# Patient Record
Sex: FEMALE | Race: WHITE | NOT HISPANIC OR LATINO | Employment: FULL TIME | URBAN - METROPOLITAN AREA
[De-identification: names, ages, dates, MRNs, and addresses within clinical notes are randomized per-mention and may not be internally consistent; named-entity substitution may affect disease eponyms.]

---

## 2018-03-19 ENCOUNTER — OFFICE VISIT (OUTPATIENT)
Dept: OBGYN CLINIC | Facility: MEDICAL CENTER | Age: 48
End: 2018-03-19
Payer: COMMERCIAL

## 2018-03-19 VITALS — SYSTOLIC BLOOD PRESSURE: 116 MMHG | BODY MASS INDEX: 34.46 KG/M2 | DIASTOLIC BLOOD PRESSURE: 72 MMHG | WEIGHT: 220 LBS

## 2018-03-19 DIAGNOSIS — Z01.419 ENCNTR FOR GYN EXAM (GENERAL) (ROUTINE) W/O ABN FINDINGS: Primary | ICD-10-CM

## 2018-03-19 DIAGNOSIS — Z12.31 ENCOUNTER FOR SCREENING MAMMOGRAM FOR MALIGNANT NEOPLASM OF BREAST: ICD-10-CM

## 2018-03-19 PROCEDURE — 87624 HPV HI-RISK TYP POOLED RSLT: CPT | Performed by: PHYSICIAN ASSISTANT

## 2018-03-19 PROCEDURE — G0145 SCR C/V CYTO,THINLAYER,RESCR: HCPCS | Performed by: PHYSICIAN ASSISTANT

## 2018-03-19 PROCEDURE — 99396 PREV VISIT EST AGE 40-64: CPT | Performed by: PHYSICIAN ASSISTANT

## 2018-03-19 NOTE — PROGRESS NOTES
Ojedaoseas Reed  1970      CC:  Yearly exam    S:  53 y o  female here for yearly exam   She is sexually active  She uses Mirena for contraception  She is amenorrheic with this, and it is due for a swap  She will complete paperwork and return it to us  Last Pap 2015 neg  Last Mammo few years ago      Current Outpatient Prescriptions:     levonorgestrel (MIRENA, 46 MG,) 20 MCG/24HR IUD, by Intrauterine route, Disp: , Rfl:   Social History     Social History    Marital status: /Civil Union     Spouse name: N/A    Number of children: N/A    Years of education: N/A     Occupational History    Not on file  Social History Main Topics    Smoking status: Never Smoker    Smokeless tobacco: Never Used    Alcohol use Yes    Drug use: No    Sexual activity: Yes     Partners: Male     Birth control/ protection: IUD     Other Topics Concern    Not on file     Social History Narrative    Coffee         Family History   Problem Relation Age of Onset    Colon cancer Maternal Grandfather       Past Medical History:   Diagnosis Date    No known problems         O:  Blood pressure 116/72, weight 99 8 kg (220 lb)  Patient appears well and is not in distress  Neck is supple without masses  Breasts are symmetrical without mass, tenderness, nipple discharge, skin changes or adenopathy  Abdomen is soft and nontender without masses  External genitals are normal without lesions or rashes  Vagina is normal without discharge or bleeding  Cervix is normal without discharge or lesion  Uterus is normal, mobile, nontender without palpable mass  Adnexa are normal, nontender, without palpable mass  A:  Yearly exam      P:   Pap and HPV today   Mammo slip provided    Swap IUD   RTO one year for yearly exam or sooner as needed

## 2018-03-21 LAB — HPV RRNA GENITAL QL NAA+PROBE: NORMAL

## 2018-03-22 LAB
LAB AP GYN PRIMARY INTERPRETATION: NORMAL
Lab: NORMAL

## 2018-03-23 ENCOUNTER — HOSPITAL ENCOUNTER (OUTPATIENT)
Dept: MAMMOGRAPHY | Facility: CLINIC | Age: 48
Discharge: HOME | End: 2018-03-23
Payer: COMMERCIAL

## 2018-03-23 DIAGNOSIS — Z12.31 ENCOUNTER FOR SCREENING MAMMOGRAM FOR MALIGNANT NEOPLASM OF BREAST: ICD-10-CM

## 2018-03-23 PROCEDURE — 77063 BREAST TOMOSYNTHESIS BI: CPT

## 2018-03-23 PROCEDURE — 77067 SCR MAMMO BI INCL CAD: CPT

## 2018-04-12 ENCOUNTER — TELEPHONE (OUTPATIENT)
Dept: OBGYN CLINIC | Facility: MEDICAL CENTER | Age: 48
End: 2018-04-12

## 2018-04-30 ENCOUNTER — OFFICE VISIT (OUTPATIENT)
Dept: OBGYN CLINIC | Facility: MEDICAL CENTER | Age: 48
End: 2018-04-30
Payer: COMMERCIAL

## 2018-04-30 VITALS — WEIGHT: 219 LBS | SYSTOLIC BLOOD PRESSURE: 118 MMHG | DIASTOLIC BLOOD PRESSURE: 72 MMHG | BODY MASS INDEX: 34.3 KG/M2

## 2018-04-30 DIAGNOSIS — Z30.433 ENCOUNTER FOR IUD REMOVAL AND REINSERTION: ICD-10-CM

## 2018-04-30 PROCEDURE — 58300 INSERT INTRAUTERINE DEVICE: CPT | Performed by: PHYSICIAN ASSISTANT

## 2018-04-30 PROCEDURE — 58301 REMOVE INTRAUTERINE DEVICE: CPT | Performed by: PHYSICIAN ASSISTANT

## 2018-04-30 NOTE — PROGRESS NOTES
Milagros Shannon  1970      CC:  IUD swap    S:  53 y o  female here for IUD swap  The reason for her swap is  IUD  She requests Mirena IUD  We reviewed the risks of IUD insertion including pain, perforation, migration, perforation, irregular bleeding, and infection  She is aware that with Mirena she can expect up to 6 months of irregular bleeding and at that point her periods should become lighter, shorter, and less crampy, and that many women stop having periods with their Mirena  She did premedicate with ibuprofen and a snack prior to insertion  Written consent was obtained from the patient prior to the procedure  No physician was available in the office and a phone consult was offered to the patient with a physician, which she declined  O:   Blood pressure 118/72, weight 99 3 kg (219 lb), last menstrual period 2018  Patient appears well and is not in distress  Abdomen is soft and nontender  External genitals are normal without lesion or rash  Vagina is normal with menses present  Cervix is normal without lesion  PROCEDURE:   IUD strings were visualized, grasped with a ring forceps, and removed without difficulty  The cervix was cleansed with Betadine  The uterus was sounded to 7 cm  IUD was inserted to the fundus without dilation and without tenaculum  Strings were trimmed to 3cm    The patient tolerated the procedure well without complication  A:  IUD swap    P: The patient will return in one month for IUD check but knows to call sooner should she have problems prior to that visit

## 2018-06-04 ENCOUNTER — OFFICE VISIT (OUTPATIENT)
Dept: OBGYN CLINIC | Facility: MEDICAL CENTER | Age: 48
End: 2018-06-04
Payer: COMMERCIAL

## 2018-06-04 VITALS — BODY MASS INDEX: 33.2 KG/M2 | WEIGHT: 212 LBS | DIASTOLIC BLOOD PRESSURE: 76 MMHG | SYSTOLIC BLOOD PRESSURE: 122 MMHG

## 2018-06-04 DIAGNOSIS — Z97.5 IUD (INTRAUTERINE DEVICE) IN PLACE: Primary | ICD-10-CM

## 2018-06-04 PROCEDURE — 99213 OFFICE O/P EST LOW 20 MIN: CPT | Performed by: PHYSICIAN ASSISTANT

## 2018-06-04 NOTE — PROGRESS NOTES
Castillorodgerblekis Nicole  1970      CC: IUD check    S: 53 y o  female here for IUD check  She is status post placement of a Mirena IUD on 4/30/18 (cristian)  She has had no bleeding since placement  She has had no pain or other side effects  No LMP recorded  Patient has had an implant  Current Outpatient Prescriptions:     levonorgestrel (MIRENA, 46 MG,) 20 MCG/24HR IUD, by Intrauterine route, Disp: , Rfl:   Social History     Social History    Marital status: /Civil Union     Spouse name: N/A    Number of children: N/A    Years of education: N/A     Occupational History    Not on file  Social History Main Topics    Smoking status: Never Smoker    Smokeless tobacco: Never Used    Alcohol use Yes    Drug use: No    Sexual activity: Yes     Partners: Male     Birth control/ protection: IUD     Other Topics Concern    Not on file     Social History Narrative    Coffee         Family History   Problem Relation Age of Onset    Colon cancer Maternal Grandfather      Past Medical History:   Diagnosis Date    No known problems        O:  Blood pressure 122/76, weight 96 2 kg (212 lb)  Abdomen is soft and nontender  External genitals are normal without rashes or lesions  Vagina is normal without discharge or bleeding  Cervix is normal without discharge or lesion  IUD strings are normal      A:  IUD in place    P:  Patient was reassured that irregular bleeding is common for the first six months of IUD use and that this should slowly resolve over time  She will call with any persistently abnormal bleeding or other problems  She will return for her yearly exam as scheduled

## 2019-08-19 ENCOUNTER — ANNUAL EXAM (OUTPATIENT)
Dept: OBGYN CLINIC | Facility: MEDICAL CENTER | Age: 49
End: 2019-08-19
Payer: COMMERCIAL

## 2019-08-19 VITALS
SYSTOLIC BLOOD PRESSURE: 118 MMHG | WEIGHT: 220.4 LBS | HEIGHT: 68 IN | DIASTOLIC BLOOD PRESSURE: 76 MMHG | BODY MASS INDEX: 33.4 KG/M2

## 2019-08-19 DIAGNOSIS — Z01.419 ENCNTR FOR GYN EXAM (GENERAL) (ROUTINE) W/O ABN FINDINGS: Primary | ICD-10-CM

## 2019-08-19 DIAGNOSIS — Z12.39 SCREENING FOR MALIGNANT NEOPLASM OF BREAST: ICD-10-CM

## 2019-08-19 PROCEDURE — 99396 PREV VISIT EST AGE 40-64: CPT | Performed by: PHYSICIAN ASSISTANT

## 2019-08-19 NOTE — PROGRESS NOTES
Ofelia Dean  1970      CC:  Yearly exam    S:  51 y o  female here for yearly exam  She is amenorrheic with her Mirena which was swapped last year  She is very happy with this  She is sexually active without pain, bleeding or dryness  We reviewed [facility] guidelines for Pap testing today  Last Pap 3/19/18 neg/neg  Last Mammo 3/23/18 neg    Family hx of breast cancer: no  Family hx of ovarian cancer: no  Family hx of colon cancer: maternal grandfather    Current Outpatient Medications:     levonorgestrel (MIRENA, 46 MG,) 20 MCG/24HR IUD, by Intrauterine route, Disp: , Rfl:   Social History     Socioeconomic History    Marital status: /Civil Union     Spouse name: Not on file    Number of children: Not on file    Years of education: Not on file    Highest education level: Not on file   Occupational History    Not on file   Social Needs    Financial resource strain: Not on file    Food insecurity:     Worry: Not on file     Inability: Not on file    Transportation needs:     Medical: Not on file     Non-medical: Not on file   Tobacco Use    Smoking status: Never Smoker    Smokeless tobacco: Never Used   Substance and Sexual Activity    Alcohol use:  Yes    Drug use: No    Sexual activity: Yes     Partners: Male     Birth control/protection: IUD   Lifestyle    Physical activity:     Days per week: Not on file     Minutes per session: Not on file    Stress: Not on file   Relationships    Social connections:     Talks on phone: Not on file     Gets together: Not on file     Attends Congregation service: Not on file     Active member of club or organization: Not on file     Attends meetings of clubs or organizations: Not on file     Relationship status: Not on file    Intimate partner violence:     Fear of current or ex partner: Not on file     Emotionally abused: Not on file     Physically abused: Not on file     Forced sexual activity: Not on file   Other Topics Concern    Not on file Social History Narrative    Coffee     Family History   Problem Relation Age of Onset    Colon cancer Maternal Grandfather       Past Medical History:   Diagnosis Date    No known problems         Review of Systems   Respiratory: Negative  Cardiovascular: Negative  Gastrointestinal: Negative for constipation and diarrhea  Genitourinary: Negative for difficulty urinating, pelvic pain, vaginal bleeding, vaginal discharge, itching or odor  O:  Blood pressure 118/76, height 5' 8" (1 727 m), weight 100 kg (220 lb 6 4 oz)  Patient appears well and is not in distress  Neck is supple without masses  Breasts are symmetrical without mass, tenderness, nipple discharge, skin changes or adenopathy  Abdomen is soft and nontender without masses  External genitals are normal without lesions or rashes  Urethral meatus and urethra are normal  Bladder is normal to palpation  Vagina is normal without discharge or bleeding  Cervix is normal without discharge or lesion  IUD strings are normal   Uterus is normal, mobile, nontender without palpable mass  Adnexa are normal, nontender, without palpable mass  A:  Yearly exam      P:   Pap due 2023   Mammo slip provided    RTO one year for yearly exam or sooner as needed

## 2019-09-23 ENCOUNTER — HOSPITAL ENCOUNTER (OUTPATIENT)
Dept: MAMMOGRAPHY | Facility: CLINIC | Age: 49
Discharge: HOME | End: 2019-09-23
Payer: COMMERCIAL

## 2019-09-23 VITALS — HEIGHT: 68 IN | WEIGHT: 215 LBS | BODY MASS INDEX: 32.58 KG/M2

## 2019-09-23 DIAGNOSIS — Z12.39 SCREENING FOR MALIGNANT NEOPLASM OF BREAST: ICD-10-CM

## 2019-09-23 PROCEDURE — 77067 SCR MAMMO BI INCL CAD: CPT

## 2019-09-23 PROCEDURE — 77063 BREAST TOMOSYNTHESIS BI: CPT

## 2020-08-24 ENCOUNTER — ANNUAL EXAM (OUTPATIENT)
Dept: OBGYN CLINIC | Facility: MEDICAL CENTER | Age: 50
End: 2020-08-24
Payer: COMMERCIAL

## 2020-08-24 VITALS
TEMPERATURE: 97.6 F | SYSTOLIC BLOOD PRESSURE: 122 MMHG | WEIGHT: 222 LBS | BODY MASS INDEX: 33.65 KG/M2 | HEIGHT: 68 IN | DIASTOLIC BLOOD PRESSURE: 80 MMHG

## 2020-08-24 DIAGNOSIS — Z12.31 ENCOUNTER FOR SCREENING MAMMOGRAM FOR MALIGNANT NEOPLASM OF BREAST: ICD-10-CM

## 2020-08-24 DIAGNOSIS — Z12.11 COLON CANCER SCREENING: ICD-10-CM

## 2020-08-24 DIAGNOSIS — Z01.419 ENCNTR FOR GYN EXAM (GENERAL) (ROUTINE) W/O ABN FINDINGS: Primary | ICD-10-CM

## 2020-08-24 PROBLEM — E78.2 MIXED HYPERLIPIDEMIA: Status: ACTIVE | Noted: 2019-12-18

## 2020-08-24 PROCEDURE — 99396 PREV VISIT EST AGE 40-64: CPT | Performed by: PHYSICIAN ASSISTANT

## 2020-08-24 NOTE — PROGRESS NOTES
Shelbie Patel  1970      CC:  Yearly exam    S:  53 y o  female here for yearly exam      Sexual activity: She is sexually active without pain, bleeding or dryness  Contraception: She uses Mirena (4/30/18) for contraception  She is amenorrheic with this  Last Pap 3/19/18 neg/neg  Last Mammo 9/23/19 neg  Last Colonoscopy never    We reviewed [facility] guidelines for Pap testing today  Family hx of breast cancer: no  Family hx of ovarian cancer: no  Family hx of colon cancer: MGF    Current Outpatient Medications:     levonorgestrel (MIRENA, 46 MG,) 20 MCG/24HR IUD, by Intrauterine route, Disp: , Rfl:   Social History     Socioeconomic History    Marital status: /Civil Union     Spouse name: Not on file    Number of children: Not on file    Years of education: Not on file    Highest education level: Not on file   Occupational History    Not on file   Social Needs    Financial resource strain: Not on file    Food insecurity     Worry: Not on file     Inability: Not on file   [employer] needs     Medical: Not on file     Non-medical: Not on file   Tobacco Use    Smoking status: Never Smoker    Smokeless tobacco: Never Used   Substance and Sexual Activity    Alcohol use: Yes     Frequency: Never     Drinks per session: 1 or 2     Binge frequency: Never    Drug use: No    Sexual activity: Yes     Partners: Male     Birth control/protection: I U D     Lifestyle    Physical activity     Days per week: Not on file     Minutes per session: Not on file    Stress: Not on file   Relationships    Social connections     Talks on phone: Not on file     Gets together: Not on file     Attends Taoism service: Not on file     Active member of club or organization: Not on file     Attends meetings of clubs or organizations: Not on file     Relationship status: Not on file    Intimate partner violence     Fear of current or ex partner: Not on file     Emotionally abused: Not on file Physically abused: Not on file     Forced sexual activity: Not on file   Other Topics Concern    Not on file   Social History Narrative    Coffee     Family History   Problem Relation Age of Onset    Colon cancer Maternal Grandfather     No Known Problems Mother     No Known Problems Maternal Grandmother     No Known Problems Paternal Grandmother     No Known Problems Half-Sister     Breast cancer Neg Hx     Ovarian cancer Neg Hx     Endometrial cancer Neg Hx       Past Medical History:   Diagnosis Date    Basal cell carcinoma (BCC) of face 2017        Review of Systems   Respiratory: Negative  Cardiovascular: Negative  Gastrointestinal: Negative for constipation and diarrhea  Genitourinary: Negative for difficulty urinating, pelvic pain, vaginal bleeding, vaginal discharge, itching or odor  O:  Blood pressure 122/80, temperature 97 6 °F (36 4 °C), temperature source Temporal, height 5' 7 72" (1 72 m), weight 101 kg (222 lb)  Patient appears well and is not in distress  Neck is supple without masses  Breasts are symmetrical without mass, tenderness, nipple discharge, skin changes or adenopathy  Abdomen is soft and nontender without masses  External genitals are normal without lesions or rashes  Urethral meatus and urethra are normal  Bladder is normal to palpation  Vagina is normal without discharge or bleeding  Cervix is normal without discharge or lesion  Uterus is normal, mobile, nontender without palpable mass  Adnexa are normal, nontender, without palpable mass  A:  Yearly exam      P:   Pap 2023    Mammo slip provided    Colonoscopy recommended, referral placed    RTO one year for yearly exam or sooner as needed